# Patient Record
Sex: FEMALE | Race: ASIAN | NOT HISPANIC OR LATINO | URBAN - METROPOLITAN AREA
[De-identification: names, ages, dates, MRNs, and addresses within clinical notes are randomized per-mention and may not be internally consistent; named-entity substitution may affect disease eponyms.]

---

## 2022-11-11 ENCOUNTER — EMERGENCY (EMERGENCY)
Facility: HOSPITAL | Age: 57
LOS: 1 days | Discharge: HOME | End: 2022-11-11
Attending: EMERGENCY MEDICINE | Admitting: EMERGENCY MEDICINE

## 2022-11-11 VITALS
HEART RATE: 65 BPM | TEMPERATURE: 98 F | SYSTOLIC BLOOD PRESSURE: 119 MMHG | RESPIRATION RATE: 16 BRPM | OXYGEN SATURATION: 99 % | DIASTOLIC BLOOD PRESSURE: 69 MMHG

## 2022-11-11 PROCEDURE — 99283 EMERGENCY DEPT VISIT LOW MDM: CPT

## 2022-11-11 RX ORDER — POLYMYXIN B SULF/TRIMETHOPRIM 10000-1/ML
1 DROPS OPHTHALMIC (EYE)
Qty: 3 | Refills: 0
Start: 2022-11-11 | End: 2022-11-17

## 2022-11-11 NOTE — ED PROVIDER NOTE - NSFOLLOWUPINSTRUCTIONS_ED_ALL_ED_FT
You were seen in the ED for eye redness and pain    This is likely caused by conjunctivitis, an infection that may be viral or bacterial.    Your symptoms will likely resolve on their own. If they do not improve in 3 days, please use the antibiotic drops sent to your pharmacy as discussed.    Follow up with ophthalmology, contact information is listed below.    Return to the ED if you experience significantly worsening pain, changes to your vision, double vision, or new fevers, redness, or swelling around your eye.

## 2022-11-11 NOTE — ED PROVIDER NOTE - CLINICAL SUMMARY MEDICAL DECISION MAKING FREE TEXT BOX
Pt is a 58 yo F pmh seizures, htn presenting with L eye redness and pain. Currently with vss, afebrile. Complains of 4d L eye pain and redness without known trauma or fb, exam with no fluorescein uptake, good pupils. Likely conjunctivitis viral vs bacterial less likely without purulence. Unlikely aac glaucoma with good pupils. Discussed with pt supportive care including cold compresses and artificial tears, if no improvement in 3d will give abx drops, recommended f/u with ophthalmology. Pt understands plan and is amenable at this time.

## 2022-11-11 NOTE — ED PROVIDER NOTE - PATIENT PORTAL LINK FT
You can access the FollowMyHealth Patient Portal offered by [facility] by registering at the following website: [email or web address]. By joining [employer]’s FollowMyHealth portal, you will also be able to view your health information using other applications (apps) compatible with our system.

## 2022-11-11 NOTE — ED PROVIDER NOTE - OBJECTIVE STATEMENT
Pt is a 56 yo F pmh seizures, htn presenting with L eye redness. Began 4 days ago waking from sleep with no known trauma, foreign object, or inciting event. Feels pain to L eye not worse with bright lights or eye movement. No pain or redness to R eye. Has been constant in this time. Denies fevers, chills, drainage from the affected eye, vision changes. Has never had this before. Used Italian  Jovanni [id number]

## 2022-11-11 NOTE — ED ADULT TRIAGE NOTE - NS ED TRIAGE AVPU SCALE
Alert-The patient is alert, awake and responds to voice. The patient is oriented to time, place, and person. The triage nurse is able to obtain subjective information.
Stable

## 2022-11-11 NOTE — ED PROVIDER NOTE - PHYSICAL EXAMINATION
CONSTITUTIONAL: Well-developed; well-nourished; in no acute distress.   SKIN: warm, dry  HEAD: Normocephalic; atraumatic.  EYES: conjunctival injection L eye, none to R eye. No obvious opacities or fb. EOMI. PERRL. Visual acuity and fields intact. No fluorescein uptake under Woods lamp. No pain improvement with tetracaine  ENT: No nasal discharge; airway clear.  NECK: Supple; good ROM  ABD: soft nondistended  EXT: Ambulates independently.  No cyanosis or edema.   NEURO: Alert, oriented, grossly unremarkable  PSYCH: Cooperative, appropriate.

## 2022-11-11 NOTE — ED PROVIDER NOTE - ATTENDING CONTRIBUTION TO CARE
Pt is a 56 yo F pmh seizures, htn presenting with L eye redness. Began 4 days ago waking from sleep with no known trauma, foreign object, or inciting event. Feels pain to L eye not worse with bright lights or eye movement. No pain or redness to R eye. Has been constant in this time. Denies fevers, chills, drainage from the affected eye, vision changes. Has never had this before. Used Tajik  Jovanni [id number]

## 2025-06-22 ENCOUNTER — EMERGENCY (EMERGENCY)
Facility: HOSPITAL | Age: 60
LOS: 1 days | End: 2025-06-22
Admitting: STUDENT IN AN ORGANIZED HEALTH CARE EDUCATION/TRAINING PROGRAM
Payer: MEDICAID

## 2025-06-22 VITALS
DIASTOLIC BLOOD PRESSURE: 82 MMHG | SYSTOLIC BLOOD PRESSURE: 127 MMHG | OXYGEN SATURATION: 98 % | WEIGHT: 119.93 LBS | TEMPERATURE: 98 F | HEIGHT: 59 IN | HEART RATE: 89 BPM | RESPIRATION RATE: 18 BRPM

## 2025-06-22 PROCEDURE — 73610 X-RAY EXAM OF ANKLE: CPT | Mod: 26,RT

## 2025-06-22 PROCEDURE — 99283 EMERGENCY DEPT VISIT LOW MDM: CPT

## 2025-06-22 PROCEDURE — 73630 X-RAY EXAM OF FOOT: CPT | Mod: 26,RT

## 2025-06-22 RX ORDER — ACETAMINOPHEN 500 MG/5ML
975 LIQUID (ML) ORAL ONCE
Refills: 0 | Status: COMPLETED | OUTPATIENT
Start: 2025-06-22 | End: 2025-06-22

## 2025-06-22 RX ADMIN — Medication 975 MILLIGRAM(S): at 21:19

## 2025-06-22 NOTE — ED ADULT TRIAGE NOTE - CHIEF COMPLAINT QUOTE
pt c/o of rt ankle pain s/p injury this afternoon, pt twisted her ankle walking with heels this afternoon, neg LOC, denies blood thinner use

## 2025-06-22 NOTE — ED PROVIDER NOTE - PATIENT PORTAL LINK FT
You can access the FollowMyHealth Patient Portal offered by [facility] by registering at the following website: [email or web address]. By joining [email or web address]’s FollowMyHealth portal, you will also be able to view your health information using other applications (apps) compatible with our system.

## 2025-06-22 NOTE — ED PROVIDER NOTE - OBJECTIVE STATEMENT
59-year-old female presents emergency department right ankle injury after twisting her ankle while wearing slippers in her house.  Patient states she has injured this ankle twice prior.  Patient denies any head trauma or fall.  Patient reports difficulty walking on ankle since injury.  Patient denies fevers chills chest pain shortness of breath abdominal pain nausea vomiting diarrhea headache dizziness

## 2025-06-22 NOTE — ED PROVIDER NOTE - CLINICAL SUMMARY MEDICAL DECISION MAKING FREE TEXT BOX
59-year-old female presents emergency department right ankle injury after twisting her ankle while wearing slippers in her house.  Patient states she has injured this ankle twice prior.  Patient denies any head trauma or fall.  Patient reports difficulty walking on ankle since injury.  Patient denies fevers chills chest pain shortness of breath abdominal pain nausea vomiting diarrhea headache dizziness    plan  - ankle/foot xray   - tylenol  - reassess

## 2025-06-23 PROBLEM — I10 ESSENTIAL (PRIMARY) HYPERTENSION: Chronic | Status: ACTIVE | Noted: 2022-11-11

## 2025-06-23 PROBLEM — R56.9 UNSPECIFIED CONVULSIONS: Chronic | Status: ACTIVE | Noted: 2022-11-11
